# Patient Record
Sex: MALE | Race: WHITE | NOT HISPANIC OR LATINO | Employment: FULL TIME | URBAN - METROPOLITAN AREA
[De-identification: names, ages, dates, MRNs, and addresses within clinical notes are randomized per-mention and may not be internally consistent; named-entity substitution may affect disease eponyms.]

---

## 2023-08-07 ENCOUNTER — HOSPITAL ENCOUNTER (OUTPATIENT)
Dept: DATA CONVERSION | Facility: HOSPITAL | Age: 33
Discharge: HOME | End: 2023-08-07

## 2023-08-07 DIAGNOSIS — Z51.89 ENCOUNTER FOR OTHER SPECIFIED AFTERCARE: ICD-10-CM

## 2024-03-12 ENCOUNTER — OFFICE VISIT (OUTPATIENT)
Dept: PRIMARY CARE | Facility: EXTERNAL LOCATION | Age: 34
End: 2024-03-12

## 2024-03-12 VITALS
OXYGEN SATURATION: 100 % | DIASTOLIC BLOOD PRESSURE: 86 MMHG | TEMPERATURE: 97.8 F | SYSTOLIC BLOOD PRESSURE: 134 MMHG | HEART RATE: 62 BPM | RESPIRATION RATE: 17 BRPM

## 2024-03-12 DIAGNOSIS — J01.90 ACUTE NON-RECURRENT SINUSITIS, UNSPECIFIED LOCATION: Primary | ICD-10-CM

## 2024-03-12 DIAGNOSIS — H66.90 ACUTE OTITIS MEDIA, UNSPECIFIED OTITIS MEDIA TYPE: ICD-10-CM

## 2024-03-12 RX ORDER — AMOXICILLIN AND CLAVULANATE POTASSIUM 875; 125 MG/1; MG/1
875 TABLET, FILM COATED ORAL 2 TIMES DAILY
Qty: 14 TABLET | Refills: 0 | Status: SHIPPED | OUTPATIENT
Start: 2024-03-12 | End: 2024-03-21

## 2024-03-12 ASSESSMENT — ENCOUNTER SYMPTOMS
COUGH: 1
WHEEZING: 0
PALPITATIONS: 0
SHORTNESS OF BREATH: 0
FEVER: 1
SINUS PRESSURE: 1

## 2024-03-12 NOTE — PATIENT INSTRUCTIONS
SINUSITIS TREATMENT  - Your antibiotic was sent to the pharmacy  - Possible medication side effects and interactions were discussed.  - Recommend probiotic.   - Recommend Mucinex, Flonase and increased liquid intake.   - Follow up with your primary care provider  in 3 days if no improvement.   - Return to clinic sooner with concerns or  any worsening symptoms.     STEPS YOU CAN TAKE AT HOME  - Drink lots of liquids to stay hydrated.  - Use cool mist humidifier in sleeping areas to avoid dry air.   - Use saline nose drops or a saline nose rinse to relieve stuffiness.  - Wash your hands often. This will help keep others healthy.  - Do not smoke or be in smoke-filled places. Avoid things that may cause breathing problems like fumes, pollution, dust, and other common allergens and irritants.  - Avoid water diving. This forces water into the sinuses from the nasal passages.  - You may take over the counter medication to help with pain as needed.      CALL YOUR PROVIDER OR GO TO URGENT CARE IF :  You have a stiff neck, especially if you also have fever, chills, vomiting, or severe headache  You have trouble thinking clearly.  You have trouble seeing or have double vision.  You have swelling or redness or pain around one or both eyes.  You have a fever of 102°F (38.9°C) or higher, or have shaking chills or sweats.  You have an upset stomach and throwing up.  You have more pain in your face and head.   You develop chest pain or shortness of breath.   With any worsening symptoms/concerns.

## 2024-03-12 NOTE — PROGRESS NOTES
Subjective   Patient ID: Dylon Stockton is a 33 y.o. male who presents for Cough (W/ congestion  history of bronchitis).    HPI:  Cough and congestion for 1 week. Had hoarse voice last week.   Complains of sinus pressure pressure in head when bends over.   [place] warm on Sunday but didn't take temp.   No chest pain or SOB.     Has tried OTC medication but not helping.     No Known Allergies    No current outpatient medications on file prior to visit.     No current facility-administered medications on file prior to visit.        History reviewed. No pertinent past medical history.    Past Surgical History:   Procedure Laterality Date    NOSE SURGERY         Review of Systems   Constitutional:  Positive for fever.   HENT:  Positive for congestion, postnasal drip and sinus pressure.    Respiratory:  Positive for cough. Negative for shortness of breath and wheezing.    Cardiovascular:  Negative for chest pain and palpitations.       Objective   Visit Vitals  /86   Pulse 62   Temp 36.6 °C (97.8 °F)   Resp 17   SpO2 100%   Smoking Status Never       Physical Exam  Constitutional:       General: He is not in acute distress.     Appearance: Normal appearance. He is not ill-appearing.   HENT:      Right Ear: Ear canal and external ear normal.      Left Ear: Tympanic membrane, ear canal and external ear normal.      Ears:      Comments: Right ear: TM erythematous      Nose: Congestion present.      Mouth/Throat:      Mouth: Mucous membranes are moist.      Pharynx: Oropharynx is clear. No oropharyngeal exudate or posterior oropharyngeal erythema.   Eyes:      General:         Right eye: No discharge.         Left eye: No discharge.      Extraocular Movements: Extraocular movements intact.      Conjunctiva/sclera: Conjunctivae normal.      Pupils: Pupils are equal, round, and reactive to light.   Cardiovascular:      Rate and Rhythm: Normal rate and regular rhythm.   Pulmonary:      Effort: Pulmonary effort is normal. No  respiratory distress.      Breath sounds: Normal breath sounds. No wheezing, rhonchi or rales.   Musculoskeletal:      Cervical back: Neck supple.   Skin:     General: Skin is warm.   Neurological:      General: No focal deficit present.      Mental Status: He is alert.      Coordination: Coordination normal.   Psychiatric:         Mood and Affect: Mood normal.         Behavior: Behavior normal.         Thought Content: Thought content normal.         Assessment/Plan   Diagnoses and all orders for this visit:  Acute non-recurrent sinusitis, unspecified location  -     amoxicillin-pot clavulanate (Augmentin) 875-125 mg tablet; Take 1 tablet (875 mg) by mouth 2 times a day for 7 days.  Acute otitis media, unspecified otitis media type    Discussed expected course of illness.   Prescription sent to pharmacy. Possible side effects discussed. Recommend probiotic.   Follow up with PCP in 3 days if no improvement. Sooner with concerns or worsening symptoms.

## 2024-03-21 ENCOUNTER — OFFICE VISIT (OUTPATIENT)
Dept: PRIMARY CARE | Facility: EXTERNAL LOCATION | Age: 34
End: 2024-03-21

## 2024-03-21 VITALS
RESPIRATION RATE: 17 BRPM | OXYGEN SATURATION: 98 % | SYSTOLIC BLOOD PRESSURE: 148 MMHG | HEART RATE: 76 BPM | DIASTOLIC BLOOD PRESSURE: 84 MMHG | TEMPERATURE: 97.8 F

## 2024-03-21 DIAGNOSIS — R01.2 S3 (THIRD HEART SOUND): ICD-10-CM

## 2024-03-21 DIAGNOSIS — H66.013 NON-RECURRENT ACUTE SUPPURATIVE OTITIS MEDIA OF BOTH EARS WITH SPONTANEOUS RUPTURE OF TYMPANIC MEMBRANES: Primary | ICD-10-CM

## 2024-03-21 RX ORDER — AZITHROMYCIN 250 MG/1
TABLET, FILM COATED ORAL
Qty: 6 TABLET | Refills: 0 | Status: SHIPPED | OUTPATIENT
Start: 2024-03-21 | End: 2024-03-26

## 2024-03-21 ASSESSMENT — ENCOUNTER SYMPTOMS
HEADACHES: 0
CHILLS: 0
PALPITATIONS: 1
ACTIVITY CHANGE: 0
DIARRHEA: 0
MYALGIAS: 0
COUGH: 0
EYE PAIN: 0
EYE REDNESS: 0
SORE THROAT: 0
RHINORRHEA: 0
SINUS PRESSURE: 0
APPETITE CHANGE: 0
SINUS PAIN: 0
FATIGUE: 0
FEVER: 0
SHORTNESS OF BREATH: 0
TROUBLE SWALLOWING: 0
EYE ITCHING: 0
WHEEZING: 0
PSYCHIATRIC NEGATIVE: 1
VOMITING: 0

## 2024-03-21 NOTE — PROGRESS NOTES
Subjective   Patient ID: Dylon Stockton is a 34 y.o. male who presents for Earache (Treated here 3/12 for sinus now has rt ear pain).    Was seen last week for sinusitis and given augmentin. Cold symptoms have resolved but right ear pain has worsened. Has completed abx. Denies congestion, fever, chills, sore throat, vomiting, diarrhea. Has been applying katia abx ear drops. Has been improved. Denies HA, dizziness, lightheadedness, weakness, CP, SOB. States he has had palpitations today, but he has consumed a lot of coffee and little water.     Earache   There is pain in the right ear. The current episode started in the past 7 days. The problem has been gradually worsening. There has been no fever. Associated symptoms include hearing loss. Pertinent negatives include no coughing, diarrhea, ear discharge, headaches, rhinorrhea, sore throat or vomiting. His past medical history is significant for hearing loss.        Review of Systems   Constitutional:  Negative for activity change, appetite change, chills, fatigue and fever.   HENT:  Positive for ear pain and hearing loss. Negative for congestion, ear discharge, postnasal drip, rhinorrhea, sinus pressure, sinus pain, sneezing, sore throat and trouble swallowing.    Eyes:  Negative for pain, redness and itching.   Respiratory:  Negative for cough, shortness of breath and wheezing.    Cardiovascular:  Positive for palpitations. Negative for chest pain.   Gastrointestinal:  Negative for diarrhea and vomiting.   Musculoskeletal:  Negative for myalgias.   Neurological:  Negative for headaches.   Psychiatric/Behavioral: Negative.         Objective   /84   Pulse 76   Temp 36.6 °C (97.8 °F)   Resp 17   SpO2 98%     Physical Exam  Vitals and nursing note reviewed.   Constitutional:       General: He is not in acute distress.  HENT:      Head: Normocephalic.      Right Ear: Decreased hearing noted. No drainage or tenderness. A middle ear effusion is present. There is no  impacted cerumen. Tympanic membrane is injected and erythematous. Tympanic membrane is not perforated, retracted or bulging.      Left Ear: No decreased hearing noted. Drainage present. No tenderness. A middle ear effusion is present. There is no impacted cerumen. Tympanic membrane is injected and erythematous. Tympanic membrane is not perforated, retracted or bulging.      Nose: No rhinorrhea.      Mouth/Throat:      Pharynx: No oropharyngeal exudate or posterior oropharyngeal erythema.   Eyes:      Conjunctiva/sclera: Conjunctivae normal.   Cardiovascular:      Rate and Rhythm: Normal rate. Rhythm irregular.      Heart sounds:      Gallop present.   Pulmonary:      Effort: Pulmonary effort is normal. No respiratory distress.      Breath sounds: No stridor. No wheezing, rhonchi or rales.   Lymphadenopathy:      Cervical: No cervical adenopathy.   Skin:     General: Skin is warm and dry.      Capillary Refill: Capillary refill takes less than 2 seconds.   Neurological:      General: No focal deficit present.      Mental Status: He is alert. Mental status is at baseline.   Psychiatric:         Mood and Affect: Mood normal.         Behavior: Behavior normal.         Thought Content: Thought content normal.         Judgment: Judgment normal.         Assessment/Plan   Diagnoses and all orders for this visit:  Non-recurrent acute suppurative otitis media of both ears with spontaneous rupture of tympanic membranes  -     azithromycin (Zithromax) 250 mg tablet; Take 2 tablets (500 mg) by mouth once daily for 1 day, THEN 1 tablet (250 mg) once daily for 4 days. Take 2 tabs (500 mg) by mouth today, than 1 daily for 4 days..  S3 (third heart sound)    S3  -increase water intake. Limit caffeine   - make follow up with PCP for further evaluation.   -if palpitations worsen, you develop CP, SOB, lightheaded, dizziness go to ED.     OM  -azithro ordered for OM d/t recent completion of augmentin.   -antihistimine, flonase, tylenol,  ibuprofen.      Acute.  Begin antibiotics as prescribed. Risks and benefits discussed, adverse effects reviewed. Follow-up with PCP if symptoms have not resolved or worsen over the next 3-4 days.

## 2024-03-21 NOTE — PATIENT INSTRUCTIONS
Follow up with PCP regarding palpitations had heart sounds.   Increase water intake.  Limit caffeine    Start antibiotic as ordered  Start flonase, antihistimin OTC  Tylenol/ibuprofen for pain.   Follow up if no improvement in ear pain in 3-4 days